# Patient Record
Sex: FEMALE | Race: WHITE
[De-identification: names, ages, dates, MRNs, and addresses within clinical notes are randomized per-mention and may not be internally consistent; named-entity substitution may affect disease eponyms.]

---

## 2018-12-22 ENCOUNTER — HOSPITAL ENCOUNTER (EMERGENCY)
Dept: HOSPITAL 31 - C.ER | Age: 23
Discharge: HOME | End: 2018-12-22
Payer: SELF-PAY

## 2018-12-22 VITALS
SYSTOLIC BLOOD PRESSURE: 110 MMHG | RESPIRATION RATE: 16 BRPM | HEART RATE: 86 BPM | TEMPERATURE: 97.8 F | DIASTOLIC BLOOD PRESSURE: 73 MMHG

## 2018-12-22 DIAGNOSIS — E03.9: ICD-10-CM

## 2018-12-22 DIAGNOSIS — N92.0: ICD-10-CM

## 2018-12-22 DIAGNOSIS — K29.70: Primary | ICD-10-CM

## 2018-12-22 LAB
ALBUMIN SERPL-MCNC: 4 G/DL (ref 3.5–5)
ALBUMIN/GLOB SERPL: 1.1 {RATIO} (ref 1–2.1)
ALT SERPL-CCNC: 32 U/L (ref 9–52)
AST SERPL-CCNC: 22 U/L (ref 14–36)
BASOPHILS # BLD AUTO: 0.1 K/UL (ref 0–0.2)
BASOPHILS NFR BLD: 0.8 % (ref 0–2)
BILIRUB UR-MCNC: NEGATIVE MG/DL
BUN SERPL-MCNC: 11 MG/DL (ref 7–17)
CALCIUM SERPL-MCNC: 9.1 MG/DL (ref 8.6–10.4)
EOSINOPHIL # BLD AUTO: 0.3 K/UL (ref 0–0.7)
EOSINOPHIL NFR BLD: 3.5 % (ref 0–4)
ERYTHROCYTE [DISTWIDTH] IN BLOOD BY AUTOMATED COUNT: 19 % (ref 11.5–14.5)
GFR NON-AFRICAN AMERICAN: > 60
GLUCOSE UR STRIP-MCNC: NORMAL MG/DL
HCG,QUALITATIVE URINE: NEGATIVE
HGB BLD-MCNC: 10.9 G/DL (ref 11–16)
LEUKOCYTE ESTERASE UR-ACNC: (no result) LEU/UL
LIPASE: 46 U/L (ref 23–300)
LYMPHOCYTES # BLD AUTO: 2.7 K/UL (ref 1–4.3)
LYMPHOCYTES NFR BLD AUTO: 29.1 % (ref 20–40)
MCH RBC QN AUTO: 22.4 PG (ref 27–31)
MCHC RBC AUTO-ENTMCNC: 32.2 G/DL (ref 33–37)
MCV RBC AUTO: 69.5 FL (ref 81–99)
MONOCYTES # BLD: 0.8 K/UL (ref 0–0.8)
MONOCYTES NFR BLD: 8.2 % (ref 0–10)
NEUTROPHILS # BLD: 5.4 K/UL (ref 1.8–7)
NEUTROPHILS NFR BLD AUTO: 58.4 % (ref 50–75)
NRBC BLD AUTO-RTO: 0 % (ref 0–2)
PH UR STRIP: 6 [PH] (ref 5–8)
PLATELET # BLD: 380 K/UL (ref 130–400)
PMV BLD AUTO: 8 FL (ref 7.2–11.7)
PROT UR STRIP-MCNC: (no result) MG/DL
RBC # BLD AUTO: 4.87 MIL/UL (ref 3.8–5.2)
RBC # UR STRIP: (no result) /UL
SP GR UR STRIP: 1.02 (ref 1–1.03)
SQUAMOUS EPITHIAL: 2 /HPF (ref 0–5)
UROBILINOGEN UR-MCNC: NORMAL MG/DL (ref 0.2–1)
WBC # BLD AUTO: 9.2 K/UL (ref 4.8–10.8)

## 2018-12-22 PROCEDURE — 96365 THER/PROPH/DIAG IV INF INIT: CPT

## 2018-12-22 PROCEDURE — 84703 CHORIONIC GONADOTROPIN ASSAY: CPT

## 2018-12-22 PROCEDURE — 99285 EMERGENCY DEPT VISIT HI MDM: CPT

## 2018-12-22 PROCEDURE — 74176 CT ABD & PELVIS W/O CONTRAST: CPT

## 2018-12-22 PROCEDURE — 87086 URINE CULTURE/COLONY COUNT: CPT

## 2018-12-22 PROCEDURE — 83690 ASSAY OF LIPASE: CPT

## 2018-12-22 PROCEDURE — 81001 URINALYSIS AUTO W/SCOPE: CPT

## 2018-12-22 PROCEDURE — 80053 COMPREHEN METABOLIC PANEL: CPT

## 2018-12-22 PROCEDURE — 85025 COMPLETE CBC W/AUTO DIFF WBC: CPT

## 2018-12-22 NOTE — C.PDOC
History Of Present Illness


24 y/o female with a PMHx of thyroid disease and menorrhagia, presents to the ED

complaining of abdominal pain associated with nausea and vomiting for past few 

days. States pain began yesterday after menstruation began. Patient admits to 

having similar pain last month during menstrual period. She reports having hx of

ovarian cyst issue, for which she is taking contraceptives but did not take it 

yesterday or today since menses started. She denies any chest pain, SOB, fever, 

chills, or diarrhea. Also states she has daily bowel movements but she feels 

mildly constipated. Of note, pt had a recent ultrasound which was normal. 





Time Seen by Provider: 12/22/18 19:11


Chief Complaint (Nursing): GI Problem


History Per: Patient


History/Exam Limitations: no limitations


Onset/Duration Of Symptoms: Days


Current Symptoms Are (Timing): Still Present


Associated Symptoms: Nausea, Vomiting





Past Medical History


Reviewed: Historical Data, Nursing Documentation, Vital Signs


Vital Signs: 





                                Last Vital Signs











Temp  98 F   12/22/18 18:48


 


Pulse  100 H  12/22/18 18:48


 


Resp  20   12/22/18 18:48


 


BP  135/89   12/22/18 18:48


 


Pulse Ox  97   12/22/18 18:48














- Medical History


PMH: Hypothyroidism


Family History: States: No Known Family Hx





- Social History


Hx Alcohol Use: No


Hx Substance Use: No





- Immunization History


Hx Tetanus Toxoid Vaccination: No


Hx Influenza Vaccination: Yes


Hx Pneumococcal Vaccination: No





Review Of Systems


Except As Marked, All Systems Reviewed And Found Negative.


Constitutional: Negative for: Fever, Chills


Gastrointestinal: Positive for: Nausea, Vomiting, Abdominal Pain, Constipation. 

Negative for: Diarrhea, Hematochezia, Hematemesis


Genitourinary: Positive for: Vaginal Bleeding.  Negative for: Dysuria, 

Frequency, Incontinence





Physical Exam





- Physical Exam


Appears: Non-toxic, No Acute Distress


Skin: Normal Color, Warm, Dry


Head: Atraumatic, Normacephalic


Eye(s): bilateral: Normal Inspection, PERRL, EOMI


Oral Mucosa: Moist


Neck: Normal ROM


Chest: Symmetrical


Cardiovascular: Rhythm Regular, No Murmur


Respiratory: No Rales, No Rhonchi, No Wheezing, Other (Lungs clear bilaterally)


Gastrointestinal/Abdominal: Soft, Tenderness (to right suprapubic region), No 

Guarding, No Rebound


Back: No CVA Tenderness, No Vertebral Tenderness


Extremity: Bilateral: Atraumatic, Normal Color And Temperature, Normal ROM


Neurological/Psych: Oriented x3, Normal Speech





ED Course And Treatment





- Laboratory Results


Result Diagrams: 


                                 12/22/18 19:54





                                 12/22/18 19:54


O2 Sat by Pulse Oximetry: 97 (RA)


Pulse Ox Interpretation: Normal





Medical Decision Making


Medical Decision Making: 





Impression:  Gastritis, Menorrhagia





Plan:


-Labs


-20 mg IV Pepcid


-CT Abdomen/Pelvis with PO contrast








The pt is feeling better and wants to go home. Ct scan cannot visualezed her 

appendix but '


there is no sign of inflammation. Advised pt to reurn to the ED if pain worsened

or presence of fever or vomiting. 








Disposition





- Disposition


Referrals: 


[facility] [place]


[facility] [Outside]


[facility] at [place] [Outside]


Disposition: HOME/ ROUTINE


Disposition Time: 22:55


Condition: GOOD


Additional Instructions: 


Keep bland diet until diarrhea resolved and take your medications as directed. 


Follow up with your pcp or medicine clinic in a few days. 


Prescriptions: 


Famotidine [Pepcid] 40 mg PO DAILY #15 tablet


Ibuprofen [Motrin] 600 mg PO Q6 #20 tab


Instructions:  Viral Gastroenteritis, Acute Abdomen (Belly Pain), Adult (DC)


Forms:  [facility] (English)





- Clinical Impression


Clinical Impression: 


 Gastroenteritis, Abdominal pain








- Scribe Statement


The provider has reviewed the documentation as recorded by the Franny Antoine





Provider Attestation: 


All medical record entries made by the Cucaibromina were at my direction and person

ally dictated by me. I have reviewed the chart and agree that the record 

accurately reflects my personal performance of the history, physical exam, 

medical decision making, and the department course for this patient. I have also

 personally directed, reviewed, and agree with the discharge instructions and 

disposition.

## 2018-12-23 NOTE — CT
Date of service: 



12/22/2018



PROCEDURE:  CT Abdomen and Pelvis without intravenous contrast



HISTORY:

abd pain



COMPARISON:

None.



TECHNIQUE:

Without contrast.. 



Contrast dose: 0



Radiation dose:



Total exam DLP = 592.18 mGy-cm.



This CT exam was performed using one or more of the following dose 

reduction techniques: Automated exposure control, adjustment of the 

mA and/or kV according to patient size, and/or use of iterative 

reconstruction technique.



FINDINGS:



LOWER THORAX:

Unremarkable. 



LIVER:

Unremarkable. No gross lesion or ductal dilatation.  



GALLBLADDER AND BILE DUCTS:

Unremarkable. 



PANCREAS:

Unremarkable. No gross lesion or ductal dilatation.



SPLEEN:

Unremarkable. 



ADRENALS:

Unremarkable. No mass. 



KIDNEYS AND URETERS:

Unremarkable. No hydronephrosis. No solid mass. 



VASCULATURE:

Unremarkable. No aortic aneurysm. No aortic atherosclerotic 

calcification or mural plaque present.



BOWEL:

Unremarkable. No obstruction. No gross mural thickening. 



APPENDIX:

Unremarkable. Normal appendix. 



PERITONEUM:

Unremarkable. No free fluid. No free air. 



LYMPH NODES:

No retroperitoneal or pelvic lymphadenopathy.  There are innumerable 

shotty subcentimeter lymph nodes in the small bowel mesenteric.  

Multiple lymph nodes of at least 5 mm diameter are seen medial to the 

cecum and ascending colon.  Findings may reflect mesenteric adenitis. 



BLADDER:

Unremarkable. 



REPRODUCTIVE:

Normal uterus 



BONES:

No acute fracture. 



OTHER FINDINGS:

None.



IMPRESSION:

Findings indicating possible mesenteric adenitis.  No other 

significant abnormality is identified.



The preliminary findings for this examination were reported by [facility] at 10:01 p.m. on 12/22/2018.  There is discordance of this 

report with the preliminary findings.  A normal appendix is 

visualized.  The findings reflecting possible mesenteric adenitis 

were not described in the preliminary report of this examination.

## 2018-12-24 VITALS — OXYGEN SATURATION: 97 %
